# Patient Record
(demographics unavailable — no encounter records)

---

## 2023-09-21 RX ORDER — PREDNISONE 20 MG/1
20 TABLET ORAL DAILY
Qty: 20 TABLET | Refills: 1 | Status: SHIPPED | OUTPATIENT
Start: 2023-09-21 | End: 2023-10-11

## 2023-09-21 NOTE — PROGRESS NOTES
Contacted by patient's spouse regarding severe poison ivy. Prednisone called into [facility].